# Patient Record
(demographics unavailable — no encounter records)

---

## 2025-02-18 NOTE — PHYSICAL EXAM
[Normal Inguinal Nodes] : no inguinal lymphadenopathy [Normal] : no posterior cervical lymphadenopathy and no anterior cervical lymphadenopathy [Coordination Grossly Intact] : coordination grossly intact [No Focal Deficits] : no focal deficits [Normal Gait] : normal gait [Speech Grossly Normal] : speech grossly normal [Alert and Oriented x3] : oriented to person, place, and time [Normal Insight/Judgement] : insight and judgment were intact [de-identified] : flat rash noted to right groin, partially crusted over [de-identified] : anxious

## 2025-02-18 NOTE — HISTORY OF PRESENT ILLNESS
[FreeTextEntry8] : 58 yo female with PMHx bipolar 1, anxiety, HLD, prediabetes, HTN, presents to the office for a follow up after UC visit. reports was seen on february 9th, dx with herpes zoster on right groin/thigh. reports burning, itching. denies any other rashes. 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 reports is concerned that she has HSV2, reports had lesions cultured at urgent care but were negative, no exudate present at that time or now. taking valtrex as directed.

## 2025-02-18 NOTE — PHYSICAL EXAM
[Normal Inguinal Nodes] : no inguinal lymphadenopathy [Normal] : no posterior cervical lymphadenopathy and no anterior cervical lymphadenopathy [Coordination Grossly Intact] : coordination grossly intact [No Focal Deficits] : no focal deficits [Normal Gait] : normal gait [Speech Grossly Normal] : speech grossly normal [Alert and Oriented x3] : oriented to person, place, and time [Normal Insight/Judgement] : insight and judgment were intact [de-identified] : flat rash noted to right groin, partially crusted over [de-identified] : anxious

## 2025-02-18 NOTE — REVIEW OF SYSTEMS
[Itching] : Itching [Anxiety] : anxiety [Negative] : Neurological [Suicidal] : not suicidal [Insomnia] : no insomnia [Depression] : no depression [de-identified] : burning to right groin

## 2025-02-18 NOTE — REVIEW OF SYSTEMS
[Itching] : Itching [Anxiety] : anxiety [Negative] : Neurological [Suicidal] : not suicidal [Insomnia] : no insomnia [Depression] : no depression [de-identified] : burning to right groin